# Patient Record
Sex: MALE | Race: WHITE | NOT HISPANIC OR LATINO | URBAN - METROPOLITAN AREA
[De-identification: names, ages, dates, MRNs, and addresses within clinical notes are randomized per-mention and may not be internally consistent; named-entity substitution may affect disease eponyms.]

---

## 2023-08-29 ENCOUNTER — OFFICE (OUTPATIENT)
Dept: URBAN - METROPOLITAN AREA CLINIC 19 | Facility: CLINIC | Age: 43
End: 2023-08-29

## 2023-08-29 VITALS
HEIGHT: 74 IN | WEIGHT: 237 LBS | HEART RATE: 58 BPM | DIASTOLIC BLOOD PRESSURE: 94 MMHG | SYSTOLIC BLOOD PRESSURE: 145 MMHG | OXYGEN SATURATION: 98 %

## 2023-08-29 DIAGNOSIS — R19.7 DIARRHEA, UNSPECIFIED: ICD-10-CM

## 2023-08-29 PROCEDURE — 99204 OFFICE O/P NEW MOD 45 MIN: CPT | Performed by: NURSE PRACTITIONER

## 2023-08-29 RX ORDER — SODIUM PICOSULFATE, MAGNESIUM OXIDE, AND ANHYDROUS CITRIC ACID 10; 3.5; 12 MG/160ML; G/160ML; G/160ML
LIQUID ORAL
Qty: 350 | Refills: 0 | Status: ACTIVE
Start: 2023-08-29

## 2023-08-29 NOTE — SERVICEHPINOTES
Mr. Casey is a 43-year-old male with PMH including chronic diarrhea, anxiety disorder, seasonal allergies, remote giardia, sleep apnea. br
pamella He presents to clinic today as a referral from Dr. Li for c/o chronic diarrhea. Records reviewed. HIV negative, CBC/CMP negative, elevated total IgA with negative TTG.  He did not have a stool panel at this time but states his stool panels are always negative.
br
pamella  He states he has had "waves" of explosive diarrhea throughout his lifetime.  States initially from age 9 and 11 and had resolution until in his 30s.  Now it has come back for the last 6-9 weeks and this has been the worst episode yet.  He has fecal urgency, rare fecal incontinence, vegetable oil, yellow appearing liquid stool fiber 6 today.  He does have some abdominal cramping associated with the bowel movement the lower abdomen. He has weight gain and rashes on forearms.br
brNo melena, hematochezia, weight loss, nausea/vomiting, anorexia, heartburn, fever, fatigue, yellow eyes or skin.  
br
pamella He is not taking Pepto-Bismol or Imodium.  He did try Lomotil which did not help him.  He also tried  a probiotic which really did not do much. No NSAIDs. pamella Hernandez does travel a few days a month regionally on an airplane.  He stopped taking Prilosec about 4 months ago after getting on his CPAP machine states his GERD disappeared after that time frame.  He has taken 3 rounds of antibiotics in the last year.  The only herbal supplements he takes is B12, vitamin-D, magnesium.
pamella
brNo family history of celiac disease, colon cancer, colon polyps, Crohn's or ulcerative colitis.  He has no personal history of pancreatic or gallbladder disease.

## 2023-09-01 LAB
ALLERGEN PROFILE, BASIC FOOD: CLASS DESCRIPTION: (no result)
ALLERGEN PROFILE, BASIC FOOD: F002-IGE MILK: <0.1 KU/L
ALLERGEN PROFILE, BASIC FOOD: F004-IGE WHEAT: <0.1 KU/L
ALLERGEN PROFILE, BASIC FOOD: F008-IGE CORN: <0.1 KU/L
ALLERGEN PROFILE, BASIC FOOD: F013-IGE PEANUT: <0.1 KU/L
ALLERGEN PROFILE, BASIC FOOD: F014-IGE SOYBEAN: <0.1 KU/L
ALLERGEN PROFILE, BASIC FOOD: F026-IGE PORK: <0.1 KU/L
ALLERGEN PROFILE, BASIC FOOD: F027-IGE BEEF: <0.1 KU/L
ALLERGEN PROFILE, BASIC FOOD: F093-IGE CHOCOLATE/CACAO: <0.1 KU/L
ALLERGEN PROFILE, BASIC FOOD: F245-IGE EGG, WHOLE: <0.1 KU/L
ALLERGEN PROFILE, BASIC FOOD: FX02-IGE FOOD MIX (SEAFOODS): NEGATIVE
C-REACTIVE PROTEIN, QUANT: 2 MG/L (ref 0–10)
CELIAC AB TTG DGP TIGA: DEAMIDATED GLIADIN ABS, IGA: 20 UNITS — HIGH (ref 0–19)
CELIAC AB TTG DGP TIGA: DEAMIDATED GLIADIN ABS, IGG: 3 UNITS (ref 0–19)
CELIAC AB TTG DGP TIGA: IMMUNOGLOBULIN A, QN, SERUM: 416 MG/DL — HIGH (ref 90–386)
CELIAC AB TTG DGP TIGA: T-TRANSGLUTAMINASE (TTG) IGA: <2 U/ML
CELIAC AB TTG DGP TIGA: T-TRANSGLUTAMINASE (TTG) IGG: <2 U/ML
TSH: 1.17 UIU/ML (ref 0.45–4.5)